# Patient Record
Sex: FEMALE | Race: WHITE | NOT HISPANIC OR LATINO | Employment: UNEMPLOYED | URBAN - NONMETROPOLITAN AREA
[De-identification: names, ages, dates, MRNs, and addresses within clinical notes are randomized per-mention and may not be internally consistent; named-entity substitution may affect disease eponyms.]

---

## 2018-06-03 ENCOUNTER — APPOINTMENT (OUTPATIENT)
Dept: CT IMAGING | Facility: HOSPITAL | Age: 38
End: 2018-06-03

## 2018-06-03 ENCOUNTER — HOSPITAL ENCOUNTER (EMERGENCY)
Facility: HOSPITAL | Age: 38
Discharge: HOME | End: 2018-06-03
Attending: EMERGENCY MEDICINE | Admitting: EMERGENCY MEDICINE

## 2018-06-03 VITALS
TEMPERATURE: 98 F | SYSTOLIC BLOOD PRESSURE: 107 MMHG | BODY MASS INDEX: 32.74 KG/M2 | HEIGHT: 63 IN | OXYGEN SATURATION: 100 % | DIASTOLIC BLOOD PRESSURE: 73 MMHG | WEIGHT: 184.8 LBS | RESPIRATION RATE: 16 BRPM | HEART RATE: 70 BPM

## 2018-06-03 DIAGNOSIS — K80.20 GALLSTONES: Primary | ICD-10-CM

## 2018-06-03 LAB
ALBUMIN SERPL-MCNC: 4 G/DL (ref 3.5–5)
ALBUMIN/GLOB SERPL: 1.1 G/DL (ref 1–2)
ALP SERPL-CCNC: 59 U/L (ref 38–126)
ALT SERPL W P-5'-P-CCNC: 25 U/L (ref 13–69)
ANION GAP SERPL CALCULATED.3IONS-SCNC: 14.6 MMOL/L (ref 10–20)
AST SERPL-CCNC: 26 U/L (ref 15–46)
B-HCG UR QL: NEGATIVE
BACTERIA UR QL AUTO: ABNORMAL /HPF
BASOPHILS # BLD AUTO: 0.06 10*3/MM3 (ref 0–0.2)
BASOPHILS NFR BLD AUTO: 0.8 % (ref 0–2.5)
BILIRUB SERPL-MCNC: 0.2 MG/DL (ref 0.2–1.3)
BILIRUB UR QL STRIP: NEGATIVE
BUN BLD-MCNC: 16 MG/DL (ref 7–20)
BUN/CREAT SERPL: 26.7 (ref 7.1–23.5)
CALCIUM SPEC-SCNC: 9 MG/DL (ref 8.4–10.2)
CHLORIDE SERPL-SCNC: 106 MMOL/L (ref 98–107)
CLARITY UR: CLEAR
CO2 SERPL-SCNC: 25 MMOL/L (ref 26–30)
COLOR UR: YELLOW
CREAT BLD-MCNC: 0.6 MG/DL (ref 0.6–1.3)
DEPRECATED RDW RBC AUTO: 42.4 FL (ref 37–54)
EOSINOPHIL # BLD AUTO: 0.19 10*3/MM3 (ref 0–0.7)
EOSINOPHIL NFR BLD AUTO: 2.5 % (ref 0–7)
ERYTHROCYTE [DISTWIDTH] IN BLOOD BY AUTOMATED COUNT: 14.1 % (ref 11.5–14.5)
GFR SERPL CREATININE-BSD FRML MDRD: 111 ML/MIN/1.73
GLOBULIN UR ELPH-MCNC: 3.5 GM/DL
GLUCOSE BLD-MCNC: 94 MG/DL (ref 74–98)
GLUCOSE UR STRIP-MCNC: NEGATIVE MG/DL
HCT VFR BLD AUTO: 37 % (ref 37–47)
HGB BLD-MCNC: 11.9 G/DL (ref 12–16)
HGB UR QL STRIP.AUTO: ABNORMAL
HYALINE CASTS UR QL AUTO: ABNORMAL /LPF
IMM GRANULOCYTES # BLD: 0.02 10*3/MM3 (ref 0–0.06)
IMM GRANULOCYTES NFR BLD: 0.3 % (ref 0–0.6)
KETONES UR QL STRIP: NEGATIVE
LEUKOCYTE ESTERASE UR QL STRIP.AUTO: NEGATIVE
LIPASE SERPL-CCNC: 148 U/L (ref 23–300)
LYMPHOCYTES # BLD AUTO: 3.19 10*3/MM3 (ref 0.6–3.4)
LYMPHOCYTES NFR BLD AUTO: 42 % (ref 10–50)
MCH RBC QN AUTO: 26.6 PG (ref 27–31)
MCHC RBC AUTO-ENTMCNC: 32.2 G/DL (ref 30–37)
MCV RBC AUTO: 82.6 FL (ref 81–99)
MONOCYTES # BLD AUTO: 0.58 10*3/MM3 (ref 0–0.9)
MONOCYTES NFR BLD AUTO: 7.6 % (ref 0–12)
NEUTROPHILS # BLD AUTO: 3.56 10*3/MM3 (ref 2–6.9)
NEUTROPHILS NFR BLD AUTO: 46.8 % (ref 37–80)
NITRITE UR QL STRIP: NEGATIVE
NRBC BLD MANUAL-RTO: 0 /100 WBC (ref 0–0)
PH UR STRIP.AUTO: 7 [PH] (ref 5–8)
PLATELET # BLD AUTO: 227 10*3/MM3 (ref 130–400)
PMV BLD AUTO: 9.3 FL (ref 6–12)
POTASSIUM BLD-SCNC: 3.6 MMOL/L (ref 3.5–5.1)
PROT SERPL-MCNC: 7.5 G/DL (ref 6.3–8.2)
PROT UR QL STRIP: NEGATIVE
RBC # BLD AUTO: 4.48 10*6/MM3 (ref 4.2–5.4)
RBC # UR: ABNORMAL /HPF
REF LAB TEST METHOD: ABNORMAL
SODIUM BLD-SCNC: 142 MMOL/L (ref 137–145)
SP GR UR STRIP: 1.02 (ref 1–1.03)
SQUAMOUS #/AREA URNS HPF: ABNORMAL /HPF
UROBILINOGEN UR QL STRIP: ABNORMAL
WBC NRBC COR # BLD: 7.6 10*3/MM3 (ref 4.8–10.8)
WBC UR QL AUTO: ABNORMAL /HPF
WHOLE BLOOD HOLD SPECIMEN: NORMAL

## 2018-06-03 PROCEDURE — 80053 COMPREHEN METABOLIC PANEL: CPT | Performed by: PHYSICIAN ASSISTANT

## 2018-06-03 PROCEDURE — 85025 COMPLETE CBC W/AUTO DIFF WBC: CPT | Performed by: PHYSICIAN ASSISTANT

## 2018-06-03 PROCEDURE — 96375 TX/PRO/DX INJ NEW DRUG ADDON: CPT

## 2018-06-03 PROCEDURE — 25010000002 ONDANSETRON PER 1 MG: Performed by: PHYSICIAN ASSISTANT

## 2018-06-03 PROCEDURE — 99284 EMERGENCY DEPT VISIT MOD MDM: CPT

## 2018-06-03 PROCEDURE — 96374 THER/PROPH/DIAG INJ IV PUSH: CPT

## 2018-06-03 PROCEDURE — 83690 ASSAY OF LIPASE: CPT | Performed by: PHYSICIAN ASSISTANT

## 2018-06-03 PROCEDURE — 74177 CT ABD & PELVIS W/CONTRAST: CPT

## 2018-06-03 PROCEDURE — 81001 URINALYSIS AUTO W/SCOPE: CPT | Performed by: PHYSICIAN ASSISTANT

## 2018-06-03 PROCEDURE — 25010000002 IOPAMIDOL 61 % SOLUTION: Performed by: EMERGENCY MEDICINE

## 2018-06-03 PROCEDURE — 25010000002 KETOROLAC TROMETHAMINE PER 15 MG: Performed by: PHYSICIAN ASSISTANT

## 2018-06-03 PROCEDURE — 81025 URINE PREGNANCY TEST: CPT | Performed by: PHYSICIAN ASSISTANT

## 2018-06-03 PROCEDURE — 96361 HYDRATE IV INFUSION ADD-ON: CPT

## 2018-06-03 RX ORDER — SODIUM CHLORIDE 0.9 % (FLUSH) 0.9 %
10 SYRINGE (ML) INJECTION AS NEEDED
Status: DISCONTINUED | OUTPATIENT
Start: 2018-06-03 | End: 2018-06-04 | Stop reason: HOSPADM

## 2018-06-03 RX ORDER — MORPHINE SULFATE 2 MG/ML
4 INJECTION, SOLUTION INTRAMUSCULAR; INTRAVENOUS ONCE
Status: DISCONTINUED | OUTPATIENT
Start: 2018-06-03 | End: 2018-06-03

## 2018-06-03 RX ORDER — PROMETHAZINE HYDROCHLORIDE 25 MG/ML
12.5 INJECTION, SOLUTION INTRAMUSCULAR; INTRAVENOUS ONCE
Status: DISCONTINUED | OUTPATIENT
Start: 2018-06-03 | End: 2018-06-03

## 2018-06-03 RX ORDER — ONDANSETRON 2 MG/ML
4 INJECTION INTRAMUSCULAR; INTRAVENOUS ONCE
Status: COMPLETED | OUTPATIENT
Start: 2018-06-03 | End: 2018-06-03

## 2018-06-03 RX ORDER — KETOROLAC TROMETHAMINE 30 MG/ML
30 INJECTION, SOLUTION INTRAMUSCULAR; INTRAVENOUS ONCE
Status: COMPLETED | OUTPATIENT
Start: 2018-06-03 | End: 2018-06-03

## 2018-06-03 RX ADMIN — ONDANSETRON 4 MG: 2 INJECTION INTRAMUSCULAR; INTRAVENOUS at 21:38

## 2018-06-03 RX ADMIN — SODIUM CHLORIDE 1000 ML: 9 INJECTION, SOLUTION INTRAVENOUS at 21:50

## 2018-06-03 RX ADMIN — IOPAMIDOL 100 ML: 612 INJECTION, SOLUTION INTRAVENOUS at 22:45

## 2018-06-03 RX ADMIN — KETOROLAC TROMETHAMINE 30 MG: 30 INJECTION, SOLUTION INTRAMUSCULAR at 21:40

## 2018-06-04 NOTE — ED PROVIDER NOTES
Subjective   38-year-old female presents with pain in her right lower abdomen.  She's had the pain since this morning.  The pain is nonradiating, no urinary symptoms no fever no chills.  She states her appetite is down.  Describes this as a sharp pain in the right lower quadrant.        History provided by:  Patient   used: No        Review of Systems   Gastrointestinal: Positive for abdominal pain.   All other systems reviewed and are negative.      History reviewed. No pertinent past medical history.    No Known Allergies    Past Surgical History:   Procedure Laterality Date   • TUBAL ABDOMINAL LIGATION         Family History   Problem Relation Age of Onset   • Cancer Father        Social History     Social History   • Marital status: Single     Social History Main Topics   • Smoking status: Never Smoker   • Smokeless tobacco: Never Used   • Alcohol use No   • Drug use: Unknown     Other Topics Concern   • Not on file           Objective   Physical Exam   Constitutional: She is oriented to person, place, and time. She appears well-developed and well-nourished.   Eyes: EOM are normal.   Neck: Normal range of motion. Neck supple.   Cardiovascular: Normal rate and regular rhythm.    Pulmonary/Chest: Effort normal and breath sounds normal.   Abdominal: Soft. Bowel sounds are normal. There is tenderness. There is no guarding.   ruq abdominal pain on palpation   Musculoskeletal: Normal range of motion.   Neurological: She is alert and oriented to person, place, and time. She has normal reflexes.   Skin: Skin is warm and dry.   Psychiatric: She has a normal mood and affect.   Nursing note and vitals reviewed.      Procedures           ED Course                  MDM      Final diagnoses:   Gallstones            Andrea Soto Jr., PANANCY  06/03/18 [id number]

## 2018-06-25 NOTE — PROGRESS NOTES
Subjective   Shara Ayoub is a 38 y.o. female.   No chief complaint on file.    Chief Complaint   Patient presents with   • Abdominal Pain     RUQ pain       History of Present Illness   Mrs. Ayoub is a 38-year-old female patient referred to the office for further evaluation and management of right upper quadrant abdominal pain.  She has had several attacks of right upper quadrant abdominal pain for the last 2-3 weeks which are becoming more frequent and more severe.  These are associated with eating.  They last anywhere from 30 minutes to several hours.  She was seen in the emergency department for these complaints on 6/3/2018 and a CT scan and pelvis at that time did demonstrate cholelithiasis.  There is no history of jaundice, acholic stools, or dark urine.    The following portions of the patient's history were reviewed and updated as appropriate: allergies, current medications, past family history, past medical history, past social history, past surgical history and problem list.     Patient Active Problem List   Diagnosis   • Calculus of gallbladder without cholecystitis without obstruction       Past Medical History:   Diagnosis Date   • Abdominal pain    • Constipation    • Gallstones    • Migraines    • Nausea        Past Surgical History:   Procedure Laterality Date   • POSTPARTUM TUBAL LIGATION     • WISDOM TOOTH EXTRACTION         Medications:   No current facility-administered medications for this visit.   No current outpatient prescriptions on file.    Facility-Administered Medications Ordered in Other Visits:   •  ampicillin-sulbactam (UNASYN) 3 g in sodium chloride 0.9 % 100 mL IVPB-MBP, 3 g, Intravenous, Once, Carrie Noble MD  •  bupivacaine PF (MARCAINE) 0.25 % injection  - ADS Override Pull, , , ,   •  heparin (porcine) 5000 UNIT/ML injection 5,000 Units, 5,000 Units, Subcutaneous, Once, Carrie Noble MD  •  lactated ringers infusion, 50 mL/hr, Intravenous,  Continuous, Carrie Noble MD, Last Rate: 50 mL/hr at 06/29/18 1130, 50 mL/hr at 06/29/18 1130  •  lidocaine (cardiac) (XYLOCAINE) 20 MG/ML injection  - ADS Override Pull, , , ,   •  midazolam (VERSED) 2 MG/2ML injection  - ADS Override Pull, , , ,     Allergies:   No Known Allergies      Family History   Problem Relation Age of Onset   • Stroke Father    • Leukemia Father    • No Known Problems Mother    • Diabetes Brother        Social History     Social History   • Marital status: Single     Social History Main Topics   • Smoking status: Never Smoker   • Smokeless tobacco: Never Used   • Alcohol use No   • Drug use: Yes     Types: Methamphetamines      Comment: Has a history of substance abuse, has been in rehab four four months.   • Sexual activity: Defer     Other Topics Concern   • Not on file         Review of Systems   Constitutional: Negative for chills, fever and unexpected weight change.   HENT: Negative for hearing loss, trouble swallowing and voice change.    Eyes: Negative for visual disturbance.   Respiratory: Negative for apnea, cough, chest tightness, shortness of breath and wheezing.    Cardiovascular: Negative for chest pain, palpitations and leg swelling.   Gastrointestinal: Negative for abdominal distention, abdominal pain, anal bleeding, blood in stool, constipation, diarrhea, nausea, rectal pain and vomiting.   Endocrine: Negative for cold intolerance and heat intolerance.   Genitourinary: Negative for difficulty urinating, dysuria and flank pain.   Musculoskeletal: Negative for back pain and gait problem.   Skin: Negative for color change, rash and wound.   Neurological: Negative for dizziness, syncope, speech difficulty, weakness, light-headedness, numbness and headaches.   Hematological: Negative for adenopathy. Does not bruise/bleed easily.   Psychiatric/Behavioral: Negative for confusion. The patient is not nervous/anxious.        Objective    /70   Pulse 74   Temp  "98.4 °F (36.9 °C) (Temporal Artery )   Ht 160 cm (63\")   Wt 83.5 kg (184 lb)   LMP 06/12/2018 (Approximate)   SpO2 99%   BMI 32.59 kg/m²     Physical Exam   Constitutional: She is oriented to person, place, and time. She appears well-developed and well-nourished.   HENT:   Head: Normocephalic and atraumatic.   Eyes: No scleral icterus.   Neck: Neck supple.   Cardiovascular: Regular rhythm.    Pulmonary/Chest: Effort normal.   Abdominal: Soft. She exhibits no distension. There is tenderness in the right upper quadrant and epigastric area.   Neurological: She is alert and oriented to person, place, and time.   Skin: Skin is warm and dry.   Psychiatric: She has a normal mood and affect. Her behavior is normal.     Outside Records:  I have taken the opportunity to review the patient's available outside records in paper format, scanned format and those available on Care Everywhere    Results Review:  I have reviewed the entirety of the patient's clinical lab results.  I have also personally reviewed the relevant patient imaging.       FINAL REPORT     TECHNIQUE:  Multiple contiguous transaxial slices through the abdomen and pelvis were obtained after the administration of contrast.     CLINICAL HISTORY:  RLQ pain, appendicitis suspected     FINDINGS:  There is bilateral lower lobe atelectasis.  The gallbladder is contracted.  There are multiple gallstones without gallbladder wall thickening or pericholecystic fluid.  The liver, spleen,  pancreas, adrenal glands and kidneys are unremarkable. The bowel gas pattern is nonobstructive. There is no wall thickening or mesenteric inflammatory stranding.  There is no evidence for appendicitis.  Uterus and ovaries are unremarkable.  The bladder is normal in contour. There is no free fluid, free air or adenopathy.  There is mild curvature of the spine.     IMPRESSION:  Cholelithiasis     Authenticated by Abdulaziz Iraheta MD on 06/03/2018 10:49:26 PM    Assessment/Plan "   Shara was seen today for abdominal pain.    Diagnoses and all orders for this visit:    Calculus of gallbladder without cholecystitis without obstruction  -     Case Request; Standing  -     lactated ringers infusion; Infuse 50 mL/hr into a venous catheter Continuous.  -     ampicillin-sulbactam (UNASYN) 3 g in sodium chloride 0.9 % 100 mL IVPB; Infuse 3 g into a venous catheter 1 (One) Time.  -     heparin (porcine) 5000 UNIT/ML injection 5,000 Units; Inject 1 mL under the skin 1 (One) Time.  -     Case Request    Other orders  -     Follow Anesthesia Guidelines / Standing Orders; Future  -     Provide NPO Instructions to Patient  -     Clorhexidine Skin Prep  -     Follow Anesthesia Guidelines / Standing Orders; Standing  -     Verify NPO Status; Standing  -     SCD (Sequential Compression Device) - Place on Patient in Pre-Op; Standing  -     Obtain Informed Consent    Ms. Ayoub is a 38-year-old female patient who clearly has symptomatic gallstone disease with imaging demonstrating gallstones.  I have recommended to her that we proceed with cholecystectomy.  I would prefer to do this sooner rather than later given that her symptoms have been progressive and worsening.  We had a detailed and extensive discussion regarding cholecystectomy along with the risks, benefits, and alternatives.  All of her questions were answered to her satisfaction.  Diagrams of the pertinent anatomy were used in explaining the surgical procedure along with its risks.  She verbalized understanding of the discussion we had and did give her informed consent to proceed with cholecystectomy.  My office will make arrangements for scheduling an preadmission testing.  The patient was given detailed instructions regarding her usual home medications and understands that she will need to be NPO after midnight the evening prior to the surgical procedure.

## 2018-06-26 ENCOUNTER — OFFICE VISIT (OUTPATIENT)
Dept: SURGERY | Facility: CLINIC | Age: 38
End: 2018-06-26

## 2018-06-26 ENCOUNTER — APPOINTMENT (OUTPATIENT)
Dept: PREADMISSION TESTING | Facility: HOSPITAL | Age: 38
End: 2018-06-26

## 2018-06-26 VITALS
WEIGHT: 184 LBS | TEMPERATURE: 98.4 F | BODY MASS INDEX: 32.6 KG/M2 | HEIGHT: 63 IN | SYSTOLIC BLOOD PRESSURE: 110 MMHG | HEART RATE: 74 BPM | DIASTOLIC BLOOD PRESSURE: 70 MMHG | OXYGEN SATURATION: 99 %

## 2018-06-26 VITALS — HEIGHT: 63 IN | BODY MASS INDEX: 28.35 KG/M2 | WEIGHT: 160 LBS

## 2018-06-26 DIAGNOSIS — K80.20 CALCULUS OF GALLBLADDER WITHOUT CHOLECYSTITIS WITHOUT OBSTRUCTION: Primary | ICD-10-CM

## 2018-06-26 LAB
B-HCG UR QL: NEGATIVE
BACTERIA UR QL AUTO: ABNORMAL /HPF
BILIRUB UR QL STRIP: NEGATIVE
CLARITY UR: CLEAR
COLOR UR: YELLOW
GLUCOSE UR STRIP-MCNC: NEGATIVE MG/DL
HGB UR QL STRIP.AUTO: NEGATIVE
HYALINE CASTS UR QL AUTO: ABNORMAL /LPF
KETONES UR QL STRIP: NEGATIVE
LEUKOCYTE ESTERASE UR QL STRIP.AUTO: ABNORMAL
NITRITE UR QL STRIP: NEGATIVE
PH UR STRIP.AUTO: 6.5 [PH] (ref 5–8)
PROT UR QL STRIP: NEGATIVE
RBC # UR: ABNORMAL /HPF
REF LAB TEST METHOD: ABNORMAL
SP GR UR STRIP: 1.01 (ref 1–1.03)
SQUAMOUS #/AREA URNS HPF: ABNORMAL /HPF
UROBILINOGEN UR QL STRIP: ABNORMAL
WBC UR QL AUTO: ABNORMAL /HPF

## 2018-06-26 PROCEDURE — 99204 OFFICE O/P NEW MOD 45 MIN: CPT | Performed by: SURGERY

## 2018-06-26 PROCEDURE — 81025 URINE PREGNANCY TEST: CPT | Performed by: SURGERY

## 2018-06-26 PROCEDURE — 81001 URINALYSIS AUTO W/SCOPE: CPT | Performed by: SURGERY

## 2018-06-26 RX ORDER — HEPARIN SODIUM 5000 [USP'U]/ML
5000 INJECTION, SOLUTION INTRAVENOUS; SUBCUTANEOUS ONCE
Status: CANCELLED | OUTPATIENT
Start: 2018-06-26 | End: 2018-06-26

## 2018-06-26 RX ORDER — IBUPROFEN 600 MG/1
TABLET ORAL
Refills: 0 | COMMUNITY
Start: 2018-06-06 | End: 2018-07-10

## 2018-06-26 RX ORDER — SODIUM CHLORIDE, SODIUM LACTATE, POTASSIUM CHLORIDE, CALCIUM CHLORIDE 600; 310; 30; 20 MG/100ML; MG/100ML; MG/100ML; MG/100ML
50 INJECTION, SOLUTION INTRAVENOUS CONTINUOUS
Status: CANCELLED | OUTPATIENT
Start: 2018-06-26

## 2018-06-29 ENCOUNTER — HOSPITAL ENCOUNTER (OUTPATIENT)
Facility: HOSPITAL | Age: 38
Setting detail: HOSPITAL OUTPATIENT SURGERY
Discharge: HOME | End: 2018-06-29
Attending: SURGERY | Admitting: SURGERY

## 2018-06-29 ENCOUNTER — ANESTHESIA (OUTPATIENT)
Dept: PERIOP | Facility: HOSPITAL | Age: 38
End: 2018-06-29

## 2018-06-29 ENCOUNTER — ANESTHESIA EVENT (OUTPATIENT)
Dept: PERIOP | Facility: HOSPITAL | Age: 38
End: 2018-06-29

## 2018-06-29 VITALS
HEART RATE: 77 BPM | SYSTOLIC BLOOD PRESSURE: 137 MMHG | BODY MASS INDEX: 28.35 KG/M2 | TEMPERATURE: 97.4 F | DIASTOLIC BLOOD PRESSURE: 85 MMHG | OXYGEN SATURATION: 97 % | RESPIRATION RATE: 18 BRPM | HEIGHT: 63 IN | WEIGHT: 160 LBS

## 2018-06-29 DIAGNOSIS — K80.20 CALCULUS OF GALLBLADDER WITHOUT CHOLECYSTITIS WITHOUT OBSTRUCTION: ICD-10-CM

## 2018-06-29 PROCEDURE — 25010000002 MIDAZOLAM PER 1 MG: Performed by: NURSE ANESTHETIST, CERTIFIED REGISTERED

## 2018-06-29 PROCEDURE — 25010000002 DEXAMETHASONE PER 1 MG: Performed by: NURSE ANESTHETIST, CERTIFIED REGISTERED

## 2018-06-29 PROCEDURE — 25010000002 ONDANSETRON PER 1 MG: Performed by: NURSE ANESTHETIST, CERTIFIED REGISTERED

## 2018-06-29 PROCEDURE — 25010000002 HEPARIN (PORCINE) PER 1000 UNITS: Performed by: SURGERY

## 2018-06-29 PROCEDURE — 25010000003 AMPICILLIN-SULBACTAM PER 1.5 G: Performed by: SURGERY

## 2018-06-29 PROCEDURE — 25010000002 KETOROLAC TROMETHAMINE PER 15 MG: Performed by: NURSE ANESTHETIST, CERTIFIED REGISTERED

## 2018-06-29 PROCEDURE — 25010000002 PROPOFOL 200 MG/20ML EMULSION: Performed by: NURSE ANESTHETIST, CERTIFIED REGISTERED

## 2018-06-29 PROCEDURE — 47562 LAPAROSCOPIC CHOLECYSTECTOMY: CPT | Performed by: SURGERY

## 2018-06-29 PROCEDURE — 25010000002 METOCLOPRAMIDE PER 10 MG: Performed by: NURSE ANESTHETIST, CERTIFIED REGISTERED

## 2018-06-29 PROCEDURE — 25010000002 FENTANYL CITRATE (PF) 100 MCG/2ML SOLUTION: Performed by: NURSE ANESTHETIST, CERTIFIED REGISTERED

## 2018-06-29 PROCEDURE — 94799 UNLISTED PULMONARY SVC/PX: CPT

## 2018-06-29 RX ORDER — HEPARIN SODIUM 5000 [USP'U]/ML
5000 INJECTION, SOLUTION INTRAVENOUS; SUBCUTANEOUS ONCE
Status: COMPLETED | OUTPATIENT
Start: 2018-06-29 | End: 2018-06-29

## 2018-06-29 RX ORDER — ROCURONIUM BROMIDE 10 MG/ML
INJECTION, SOLUTION INTRAVENOUS AS NEEDED
Status: DISCONTINUED | OUTPATIENT
Start: 2018-06-29 | End: 2018-06-29 | Stop reason: SURG

## 2018-06-29 RX ORDER — ONDANSETRON 2 MG/ML
4 INJECTION INTRAMUSCULAR; INTRAVENOUS ONCE AS NEEDED
Status: DISCONTINUED | OUTPATIENT
Start: 2018-06-29 | End: 2018-06-30 | Stop reason: HOSPADM

## 2018-06-29 RX ORDER — HYDROCODONE BITARTRATE AND ACETAMINOPHEN 7.5; 325 MG/1; MG/1
1-2 TABLET ORAL EVERY 4 HOURS PRN
Qty: 25 TABLET | Refills: 0 | Status: SHIPPED | OUTPATIENT
Start: 2018-06-29 | End: 2018-07-06

## 2018-06-29 RX ORDER — HYDROCODONE BITARTRATE AND ACETAMINOPHEN 7.5; 325 MG/1; MG/1
1 TABLET ORAL ONCE AS NEEDED
Status: DISCONTINUED | OUTPATIENT
Start: 2018-06-29 | End: 2018-06-30 | Stop reason: HOSPADM

## 2018-06-29 RX ORDER — MIDAZOLAM HYDROCHLORIDE 1 MG/ML
INJECTION INTRAMUSCULAR; INTRAVENOUS AS NEEDED
Status: DISCONTINUED | OUTPATIENT
Start: 2018-06-29 | End: 2018-06-29 | Stop reason: SURG

## 2018-06-29 RX ORDER — ONDANSETRON 2 MG/ML
4 INJECTION INTRAMUSCULAR; INTRAVENOUS ONCE AS NEEDED
Status: DISCONTINUED | OUTPATIENT
Start: 2018-06-29 | End: 2018-06-29 | Stop reason: HOSPADM

## 2018-06-29 RX ORDER — MIDAZOLAM HYDROCHLORIDE 1 MG/ML
INJECTION INTRAMUSCULAR; INTRAVENOUS
Status: COMPLETED
Start: 2018-06-29 | End: 2018-06-29

## 2018-06-29 RX ORDER — NEOSTIGMINE METHYLSULFATE 5 MG/5 ML
SYRINGE (ML) INTRAVENOUS AS NEEDED
Status: DISCONTINUED | OUTPATIENT
Start: 2018-06-29 | End: 2018-06-29 | Stop reason: SURG

## 2018-06-29 RX ORDER — PROMETHAZINE HYDROCHLORIDE 25 MG/ML
12.5 INJECTION, SOLUTION INTRAMUSCULAR; INTRAVENOUS ONCE AS NEEDED
Status: DISCONTINUED | OUTPATIENT
Start: 2018-06-29 | End: 2018-06-30 | Stop reason: HOSPADM

## 2018-06-29 RX ORDER — DEXAMETHASONE SODIUM PHOSPHATE 4 MG/ML
INJECTION, SOLUTION INTRA-ARTICULAR; INTRALESIONAL; INTRAMUSCULAR; INTRAVENOUS; SOFT TISSUE AS NEEDED
Status: DISCONTINUED | OUTPATIENT
Start: 2018-06-29 | End: 2018-06-29 | Stop reason: SURG

## 2018-06-29 RX ORDER — GLYCOPYRROLATE 0.2 MG/ML
INJECTION INTRAMUSCULAR; INTRAVENOUS AS NEEDED
Status: DISCONTINUED | OUTPATIENT
Start: 2018-06-29 | End: 2018-06-29 | Stop reason: SURG

## 2018-06-29 RX ORDER — ONDANSETRON 2 MG/ML
INJECTION INTRAMUSCULAR; INTRAVENOUS AS NEEDED
Status: DISCONTINUED | OUTPATIENT
Start: 2018-06-29 | End: 2018-06-29 | Stop reason: SURG

## 2018-06-29 RX ORDER — MAGNESIUM HYDROXIDE 1200 MG/15ML
LIQUID ORAL AS NEEDED
Status: DISCONTINUED | OUTPATIENT
Start: 2018-06-29 | End: 2018-06-29 | Stop reason: HOSPADM

## 2018-06-29 RX ORDER — MORPHINE SULFATE 2 MG/ML
2 INJECTION, SOLUTION INTRAMUSCULAR; INTRAVENOUS
Status: DISCONTINUED | OUTPATIENT
Start: 2018-06-29 | End: 2018-06-30 | Stop reason: HOSPADM

## 2018-06-29 RX ORDER — LIDOCAINE HYDROCHLORIDE 10 MG/ML
INJECTION, SOLUTION INFILTRATION; PERINEURAL AS NEEDED
Status: DISCONTINUED | OUTPATIENT
Start: 2018-06-29 | End: 2018-06-29 | Stop reason: HOSPADM

## 2018-06-29 RX ORDER — SODIUM CHLORIDE, SODIUM LACTATE, POTASSIUM CHLORIDE, CALCIUM CHLORIDE 600; 310; 30; 20 MG/100ML; MG/100ML; MG/100ML; MG/100ML
50 INJECTION, SOLUTION INTRAVENOUS CONTINUOUS
Status: DISCONTINUED | OUTPATIENT
Start: 2018-06-29 | End: 2018-06-30 | Stop reason: HOSPADM

## 2018-06-29 RX ORDER — PROPOFOL 10 MG/ML
INJECTION, EMULSION INTRAVENOUS AS NEEDED
Status: DISCONTINUED | OUTPATIENT
Start: 2018-06-29 | End: 2018-06-29 | Stop reason: SURG

## 2018-06-29 RX ORDER — FENTANYL CITRATE 50 UG/ML
INJECTION, SOLUTION INTRAMUSCULAR; INTRAVENOUS AS NEEDED
Status: DISCONTINUED | OUTPATIENT
Start: 2018-06-29 | End: 2018-06-29 | Stop reason: SURG

## 2018-06-29 RX ORDER — BUPIVACAINE HYDROCHLORIDE 2.5 MG/ML
INJECTION, SOLUTION EPIDURAL; INFILTRATION; INTRACAUDAL AS NEEDED
Status: DISCONTINUED | OUTPATIENT
Start: 2018-06-29 | End: 2018-06-29 | Stop reason: SURG

## 2018-06-29 RX ORDER — BUPIVACAINE HYDROCHLORIDE 2.5 MG/ML
INJECTION, SOLUTION EPIDURAL; INFILTRATION; INTRACAUDAL
Status: COMPLETED
Start: 2018-06-29 | End: 2018-06-29

## 2018-06-29 RX ORDER — MEPERIDINE HYDROCHLORIDE 50 MG/ML
25 INJECTION INTRAMUSCULAR; INTRAVENOUS; SUBCUTANEOUS
Status: DISCONTINUED | OUTPATIENT
Start: 2018-06-29 | End: 2018-06-29 | Stop reason: HOSPADM

## 2018-06-29 RX ORDER — ACETAMINOPHEN 500 MG
1000 TABLET ORAL ONCE
Status: COMPLETED | OUTPATIENT
Start: 2018-06-29 | End: 2018-06-29

## 2018-06-29 RX ORDER — BUPIVACAINE HYDROCHLORIDE 2.5 MG/ML
INJECTION, SOLUTION EPIDURAL; INFILTRATION; INTRACAUDAL AS NEEDED
Status: DISCONTINUED | OUTPATIENT
Start: 2018-06-29 | End: 2018-06-29 | Stop reason: HOSPADM

## 2018-06-29 RX ORDER — KETOROLAC TROMETHAMINE 30 MG/ML
INJECTION, SOLUTION INTRAMUSCULAR; INTRAVENOUS AS NEEDED
Status: DISCONTINUED | OUTPATIENT
Start: 2018-06-29 | End: 2018-06-29 | Stop reason: SURG

## 2018-06-29 RX ORDER — METOCLOPRAMIDE HYDROCHLORIDE 5 MG/ML
INJECTION INTRAMUSCULAR; INTRAVENOUS AS NEEDED
Status: DISCONTINUED | OUTPATIENT
Start: 2018-06-29 | End: 2018-06-29 | Stop reason: SURG

## 2018-06-29 RX ADMIN — GLYCOPYRROLATE 0.3 MG: 0.2 INJECTION, SOLUTION INTRAMUSCULAR; INTRAVENOUS at 15:33

## 2018-06-29 RX ADMIN — ACETAMINOPHEN 1000 MG: 500 TABLET, FILM COATED ORAL at 11:32

## 2018-06-29 RX ADMIN — MIDAZOLAM HYDROCHLORIDE 2 MG: 1 INJECTION, SOLUTION INTRAMUSCULAR; INTRAVENOUS at 13:26

## 2018-06-29 RX ADMIN — PROPOFOL 200 MG: 10 INJECTION, EMULSION INTRAVENOUS at 14:35

## 2018-06-29 RX ADMIN — MIDAZOLAM HYDROCHLORIDE 2 MG: 1 INJECTION, SOLUTION INTRAMUSCULAR; INTRAVENOUS at 13:16

## 2018-06-29 RX ADMIN — ROCURONIUM BROMIDE 30 MG: 10 INJECTION INTRAVENOUS at 14:35

## 2018-06-29 RX ADMIN — ONDANSETRON 4 MG: 2 INJECTION INTRAMUSCULAR; INTRAVENOUS at 14:35

## 2018-06-29 RX ADMIN — FENTANYL CITRATE 100 MCG: 50 INJECTION, SOLUTION INTRAMUSCULAR; INTRAVENOUS at 14:35

## 2018-06-29 RX ADMIN — BUPIVACAINE HYDROCHLORIDE 50 ML: 2.5 INJECTION, SOLUTION EPIDURAL; INFILTRATION; INTRACAUDAL; PERINEURAL at 13:32

## 2018-06-29 RX ADMIN — METOCLOPRAMIDE 10 MG: 5 INJECTION, SOLUTION INTRAMUSCULAR; INTRAVENOUS at 14:35

## 2018-06-29 RX ADMIN — HEPARIN SODIUM 5000 UNITS: 5000 INJECTION, SOLUTION INTRAVENOUS; SUBCUTANEOUS at 14:25

## 2018-06-29 RX ADMIN — PROPOFOL 100 MG: 10 INJECTION, EMULSION INTRAVENOUS at 15:01

## 2018-06-29 RX ADMIN — LIDOCAINE HYDROCHLORIDE 100 MG: 20 INJECTION, SOLUTION INTRAVENOUS at 14:35

## 2018-06-29 RX ADMIN — SODIUM CHLORIDE 3 G: 9 INJECTION, SOLUTION INTRAVENOUS at 14:30

## 2018-06-29 RX ADMIN — KETOROLAC TROMETHAMINE 30 MG: 30 INJECTION, SOLUTION INTRAMUSCULAR at 15:33

## 2018-06-29 RX ADMIN — SODIUM CHLORIDE, POTASSIUM CHLORIDE, SODIUM LACTATE AND CALCIUM CHLORIDE 50 ML/HR: 600; 310; 30; 20 INJECTION, SOLUTION INTRAVENOUS at 11:30

## 2018-06-29 RX ADMIN — HYDROCODONE BITARTRATE AND ACETAMINOPHEN 1 TABLET: 7.5; 325 TABLET ORAL at 17:25

## 2018-06-29 RX ADMIN — MIDAZOLAM HYDROCHLORIDE 2 MG: 1 INJECTION, SOLUTION INTRAMUSCULAR; INTRAVENOUS at 14:28

## 2018-06-29 RX ADMIN — Medication 3 MG: at 15:33

## 2018-06-29 RX ADMIN — DEXAMETHASONE SODIUM PHOSPHATE 10 MG: 4 INJECTION, SOLUTION INTRAMUSCULAR; INTRAVENOUS at 14:35

## 2018-06-29 NOTE — ANESTHESIA POSTPROCEDURE EVALUATION
Patient: Shara Ayoub    Procedure Summary     Date:  06/29/18 Room / Location:  [facility] OR 4 /  [facility]    Anesthesia Start:  1430 Anesthesia Stop:  1600    Procedure:  CHOLECYSTECTOMY LAPAROSCOPIC (N/A Abdomen) Diagnosis:       Calculus of gallbladder without cholecystitis without obstruction      (Calculus of gallbladder without cholecystitis without obstruction [K80.20])    Surgeon:  Carrie Noble MD Provider:  Julien Elena CRNA    Anesthesia Type:  general ASA Status:  1          Anesthesia Type: general  Last vitals  /67   Temp   98.4   Pulse   57   Resp   12    SpO2   100%     Post Anesthesia Care and Evaluation    Patient location during evaluation: PACU  Patient participation: complete - patient participated  Level of consciousness: awake, awake and alert and sleepy but conscious  Pain score: 4  Pain management: adequate  Airway patency: patent  Anesthetic complications: No anesthetic complications  PONV Status: none  Cardiovascular status: acceptable  Respiratory status: acceptable, face mask, spontaneous ventilation and nonlabored ventilation  Hydration status: acceptable

## 2018-06-29 NOTE — ANESTHESIA PROCEDURE NOTES
Airway  Urgency: elective    Airway not difficult    General Information and Staff    Patient location during procedure: OR  CRNA: CELIA FRANCES    Indications and Patient Condition  Indications for airway management: airway protection    Preoxygenated: yes  Mask difficulty assessment: 1 - vent by mask    Final Airway Details  Final airway type: endotracheal airway      Successful airway: ETT  Cuffed: yes   Successful intubation technique: direct laryngoscopy  Facilitating devices/methods: intubating stylet  Endotracheal tube insertion site: oral  Blade: Ken  Blade size: #4  ETT size: 7.5 mm  Cormack-Lehane Classification: grade I - full view of glottis  Placement verified by: chest auscultation and capnometry   Measured from: lips  ETT to lips (cm): 21  Number of attempts at approach: 1    Additional Comments  Dentition and Lips as preoperative assessment. Airway placed without complication. ETT cuff inflated to minimal occlusive pressure.

## 2018-06-29 NOTE — ANESTHESIA PREPROCEDURE EVALUATION
Anesthesia Evaluation     Patient summary reviewed and Nursing notes reviewed   NPO Solid Status: > 8 hours  NPO Liquid Status: > 8 hours           Airway   Mallampati: I  TM distance: <3 FB  Neck ROM: full  no difficulty expected  Dental - normal exam   (+) poor dentition    Pulmonary - negative pulmonary ROS and normal exam   Cardiovascular - negative cardio ROS and normal exam  Exercise tolerance: good (4-7 METS)        Neuro/Psych  (+) headaches,     GI/Hepatic/Renal/Endo - negative ROS     Musculoskeletal (-) negative ROS    Abdominal  - normal exam    Bowel sounds: normal.   Substance History - negative use     OB/GYN negative ob/gyn ROS         Other        ROS/Med Hx Other: Former addict  Participates in [employer] Program                Anesthesia Plan    ASA 1     general     intravenous induction   Anesthetic plan and risks discussed with patient.    Plan discussed with attending.

## 2018-06-29 NOTE — ANESTHESIA PROCEDURE NOTES
Peripheral Block    Patient location during procedure: holding area  Start time: 6/29/2018 1:26 PM  Stop time: 6/29/2018 1:32 PM  Reason for block: procedure for pain, at surgeon's request and post-op pain management  Performed by  CRNA: PEDRO DUMAS  Preanesthetic Checklist  Completed: patient identified, site marked, surgical consent, pre-op evaluation, timeout performed, IV checked, risks and benefits discussed and monitors and equipment checked  Prep:  Pt Position: sitting  Sterile barriers:cap, gloves, mask and sterile barriers  Prep: ChloraPrep  Patient monitoring: blood pressure monitoring, continuous pulse oximetry and EKG  Procedure  Sedation:yes    Guidance:ultrasound guided and nerve stimulator  Images:still images obtained    Laterality:Bilateral  Anesthesia block type: erector spinae block.  Injection Technique:single-shot  Needle Type:echogenic  Needle Gauge:20 G    Medications  Comment:Left ANNA 20 ml  Right ANNA 30 ml  Local Injected:bupivacaine 0.25% Local Amount Injected:50mL  Post Assessment  Injection Assessment: negative aspiration for heme, no paresthesia on injection and incremental injection  Patient Tolerance:comfortable throughout block  Complications:no  Additional Notes  Transverse process identified at approxT8-9.  Patrick 20 g needle directed towards transverse process.  ANNA was penetrated and LA deposited between ANNA and transverse process.  Superior and Inferior hydrodissection of fascial plane was present

## 2018-06-30 ENCOUNTER — HOSPITAL ENCOUNTER (EMERGENCY)
Facility: HOSPITAL | Age: 38
Discharge: LEFT BEFORE DISCHARGE | End: 2018-06-30
Attending: STUDENT IN AN ORGANIZED HEALTH CARE EDUCATION/TRAINING PROGRAM

## 2018-06-30 VITALS
DIASTOLIC BLOOD PRESSURE: 72 MMHG | RESPIRATION RATE: 18 BRPM | HEART RATE: 65 BPM | TEMPERATURE: 98.5 F | WEIGHT: 185.8 LBS | BODY MASS INDEX: 31.72 KG/M2 | HEIGHT: 64 IN | OXYGEN SATURATION: 98 % | SYSTOLIC BLOOD PRESSURE: 123 MMHG

## 2018-07-06 ENCOUNTER — OFFICE VISIT (OUTPATIENT)
Dept: SURGERY | Facility: CLINIC | Age: 38
End: 2018-07-06

## 2018-07-06 ENCOUNTER — TELEPHONE (OUTPATIENT)
Dept: SURGERY | Facility: CLINIC | Age: 38
End: 2018-07-06

## 2018-07-06 VITALS
OXYGEN SATURATION: 98 % | DIASTOLIC BLOOD PRESSURE: 84 MMHG | HEIGHT: 64 IN | TEMPERATURE: 98.4 F | SYSTOLIC BLOOD PRESSURE: 120 MMHG | BODY MASS INDEX: 31.07 KG/M2 | WEIGHT: 182 LBS | HEART RATE: 112 BPM

## 2018-07-06 DIAGNOSIS — Z48.89 POSTOPERATIVE VISIT: Primary | ICD-10-CM

## 2018-07-06 PROCEDURE — 99024 POSTOP FOLLOW-UP VISIT: CPT | Performed by: SURGERY

## 2018-07-06 NOTE — TELEPHONE ENCOUNTER
Pt called the office, she stated that her upper abdominal incision is gaping open, I gave her an appt to be seen in the office today @ 2:30 by Dr. Zheng.

## 2018-07-06 NOTE — PROGRESS NOTES
"Patient: Shara Ayoub    YOB: 1980    Date: 07/06/2018    Primary Care Provider: No Known Provider    Reason for Consultation: Follow-up lap mj    Chief Complaint   Patient presents with   • Post-op     PO lap mj done by Dr. Noble       History of present illness:  I saw the patient in the office today as a followup from their recent laparoscopic cholecystectomy done by Dr. Noble on 6-29-18. She is complaining of pain and bleeding from her incisions. Patient has a small blister from the Steri-Strip.  Otherwise doing well with no complications.    The following portions of the patient's history were reviewed and updated as appropriate: allergies, current medications, past family history, past medical history, past social history, past surgical history and problem list.      Vital Signs:   Vitals:    07/06/18 1400   BP: 120/84   Pulse: 112   Temp: 98.4 °F (36.9 °C)   TempSrc: Temporal Artery    SpO2: 98%   Weight: 82.6 kg (182 lb)   Height: 162.6 cm (64\")       Physical Exam:   General Appearance:    Alert, cooperative, in no acute distress   Abdomen:     no masses, no organomegaly, soft non-tender, non-distended, no guarding, wounds are well healed     Assessment / Plan :    1. Postoperative visit        I did discuss the situation with the patient today in the office and they have done well from their recent laparoscopic cholecystectomy.  I have released the patient back to normal activity, they understand that they need to be careful about heavy lifting.  I need to see the patient back in the office only if they are having further problems, they know to call me if they are.Patient instructed to place Neosporin on small blister daily and shower normally.  Follow-up with any further problems.    Electronically signed by Zeeshan Zheng MD  07/06/18        Portions of this note have been scribed for Zeeshan Zheng MD by Fariha James 7/6/2018  3:03 PM          "

## 2018-07-10 ENCOUNTER — OFFICE VISIT (OUTPATIENT)
Dept: SURGERY | Facility: CLINIC | Age: 38
End: 2018-07-10

## 2018-07-10 VITALS
SYSTOLIC BLOOD PRESSURE: 118 MMHG | HEIGHT: 64 IN | BODY MASS INDEX: 31.24 KG/M2 | WEIGHT: 183 LBS | OXYGEN SATURATION: 97 % | TEMPERATURE: 97.8 F | HEART RATE: 96 BPM | DIASTOLIC BLOOD PRESSURE: 74 MMHG

## 2018-07-10 DIAGNOSIS — K80.20 CALCULUS OF GALLBLADDER WITHOUT CHOLECYSTITIS WITHOUT OBSTRUCTION: Primary | ICD-10-CM

## 2018-07-10 DIAGNOSIS — Z90.49 S/P LAPAROSCOPIC CHOLECYSTECTOMY: ICD-10-CM

## 2018-07-10 LAB
LAB AP CASE REPORT: NORMAL
PATH REPORT.FINAL DX SPEC: NORMAL

## 2018-07-10 PROCEDURE — 99024 POSTOP FOLLOW-UP VISIT: CPT | Performed by: SURGERY

## 2018-07-10 RX ORDER — DOCUSATE SODIUM 100 MG/1
100 CAPSULE, LIQUID FILLED ORAL 2 TIMES DAILY
Qty: 60 CAPSULE | Refills: 1 | Status: SHIPPED | OUTPATIENT
Start: 2018-07-10

## 2018-07-10 NOTE — PROGRESS NOTES
"Subjective   Shara Ayoub is a 38 y.o. female.   Chief Complaint   Patient presents with   • Post-op     post op lap mj       History of Present Illness   Miss Ayoub is a 38-year-old female patient status post laparoscopic cholecystectomy for cholelithiasis.  Pathology was concordant.  She reports no post procedure complaints but states that she is doing well overall.  She denies fever, chills, nausea, vomiting, or diarrhea.    The following portions of the patient's history were reviewed and updated as appropriate: allergies, current medications, past family history, past medical history, past social history, past surgical history and problem list.    Review of Systems    Objective    /74   Pulse 96   Temp 97.8 °F (36.6 °C)   Ht 162.6 cm (64.02\")   Wt 83 kg (183 lb)   LMP 06/12/2018 (Approximate)   SpO2 97%   BMI 31.40 kg/m²     Physical Exam   Constitutional: She is oriented to person, place, and time. She appears well-developed and well-nourished.   HENT:   Head: Normocephalic and atraumatic.   Eyes: No scleral icterus.   Neck: Neck supple.   Cardiovascular: Regular rhythm.    Pulmonary/Chest: Effort normal.   Abdominal: Soft. She exhibits no distension. There is no tenderness.   Incisions healing well.  Clean dry and intact without evidence of infection.   Neurological: She is alert and oriented to person, place, and time.   Skin: Skin is warm and dry.   Psychiatric: She has a normal mood and affect. Her behavior is normal.       Assessment/Plan   Shara was seen today for post-op.    Diagnoses and all orders for this visit:    Calculus of gallbladder without cholecystitis without obstruction    S/P laparoscopic cholecystectomy    Other orders  -     docusate sodium (COLACE) 100 MG capsule; Take 1 capsule by mouth 2 (Two) Times a Day.      I had the pleasure of seeing Shara Ayoub in follow-up today for the first  postoperative visit following laparoscopic cholecystectomy for " chronic cholecystitis with cholelithiasis.  Overall, Shara Ayoub  is enjoying uncomplicated recovery.  I do not anticipate any ongoing surgical issues and will return the patient back to the care of their PCP.  I have released Shara Ayoub to unrestricted physical activity beginning four weeks after her operative date. The patient may follow up in this office as needed.

## (undated) DEVICE — SUT VIC 0/0 UR6 27IN DYED J603H

## (undated) DEVICE — TP ELECTRD LAP L WR SPLIT33CM

## (undated) DEVICE — ENDOPATH XCEL UNIVERSAL TROCAR STABLILITY SLEEVES: Brand: ENDOPATH XCEL

## (undated) DEVICE — SPNG GZ WOVN 4X4IN 12PLY 10/BX STRL

## (undated) DEVICE — GLV SURG ULTRATOUCH BIOGEL/COAT PF LF SZ6 STRL

## (undated) DEVICE — 2, DISPOSABLE SUCTION/IRRIGATOR WITHOUT DISPOSABLE TIP: Brand: STRYKEFLOW

## (undated) DEVICE — CUFF SCD HEMOFORCE SEQ CALF STD MD

## (undated) DEVICE — ENDOPATH XCEL BLUNT TIP TROCARS WITH SMOOTH SLEEVES: Brand: ENDOPATH XCEL

## (undated) DEVICE — SPNG GZ STRL 2S 4X4 12PLY

## (undated) DEVICE — DISPOSABLE MONOPOLAR ENDOSCOPIC CORD 10 FT. (3M): Brand: KIRWAN

## (undated) DEVICE — ENDOPATH XCEL BLADELESS TROCARS WITH STABILITY SLEEVES: Brand: ENDOPATH XCEL

## (undated) DEVICE — RICH GENERAL LAPAROSCOPY: Brand: MEDLINE INDUSTRIES, INC.

## (undated) DEVICE — MONOPOLAR METZENBAUM SCISSOR, MINI BLADE TIP, DISPOSABLE: Brand: MONOPOLAR METZENBAUM SCISSOR, MINI BLADE TIP, DISPOSABLE

## (undated) DEVICE — BNDG ADHS PLSTC 1X3IN LF

## (undated) DEVICE — ENDOPOUCH RETRIEVER SPECIMEN RETRIEVAL BAGS: Brand: ENDOPOUCH RETRIEVER

## (undated) DEVICE — 3M™ STERI-STRIP™ REINFORCED ADHESIVE SKIN CLOSURES, R1547, 1/2 IN X 4 IN (12 MM X 100 MM), 6 STRIPS/ENVELOPE: Brand: 3M™ STERI-STRIP™

## (undated) DEVICE — SOL NACL 0.9PCT 1000ML

## (undated) DEVICE — UNDYED BRAIDED (POLYGLACTIN 910), SYNTHETIC ABSORBABLE SUTURE: Brand: COATED VICRYL

## (undated) DEVICE — GLV SURG SENSICARE GREEN W/ALOE PF LF 6 STRL

## (undated) DEVICE — DRSNG SURESITE WNDW 4X4.5